# Patient Record
Sex: FEMALE | Race: OTHER | Employment: UNEMPLOYED | URBAN - METROPOLITAN AREA
[De-identification: names, ages, dates, MRNs, and addresses within clinical notes are randomized per-mention and may not be internally consistent; named-entity substitution may affect disease eponyms.]

---

## 2019-05-31 ENCOUNTER — ANESTHESIA EVENT (OUTPATIENT)
Dept: SURGERY | Facility: HOSPITAL | Age: 24
DRG: 419 | End: 2019-05-31
Payer: COMMERCIAL

## 2019-05-31 ENCOUNTER — APPOINTMENT (OUTPATIENT)
Dept: ULTRASOUND IMAGING | Facility: HOSPITAL | Age: 24
DRG: 419 | End: 2019-05-31
Attending: EMERGENCY MEDICINE
Payer: COMMERCIAL

## 2019-05-31 ENCOUNTER — APPOINTMENT (OUTPATIENT)
Dept: GENERAL RADIOLOGY | Facility: HOSPITAL | Age: 24
DRG: 419 | End: 2019-05-31
Attending: SURGERY
Payer: COMMERCIAL

## 2019-05-31 ENCOUNTER — ANESTHESIA (OUTPATIENT)
Dept: SURGERY | Facility: HOSPITAL | Age: 24
DRG: 419 | End: 2019-05-31
Payer: COMMERCIAL

## 2019-05-31 ENCOUNTER — HOSPITAL ENCOUNTER (INPATIENT)
Facility: HOSPITAL | Age: 24
LOS: 3 days | Discharge: HOME | DRG: 419 | End: 2019-06-03
Attending: EMERGENCY MEDICINE | Admitting: INTERNAL MEDICINE
Payer: COMMERCIAL

## 2019-05-31 DIAGNOSIS — K81.9 CHOLECYSTITIS: ICD-10-CM

## 2019-05-31 DIAGNOSIS — K81.0 ACUTE CHOLECYSTITIS: Primary | ICD-10-CM

## 2019-05-31 PROBLEM — R73.9 HYPERGLYCEMIA: Status: ACTIVE | Noted: 2019-05-31

## 2019-05-31 PROCEDURE — 99285 EMERGENCY DEPT VISIT HI MDM: CPT

## 2019-05-31 PROCEDURE — 80048 BASIC METABOLIC PNL TOTAL CA: CPT | Performed by: EMERGENCY MEDICINE

## 2019-05-31 PROCEDURE — 83690 ASSAY OF LIPASE: CPT | Performed by: EMERGENCY MEDICINE

## 2019-05-31 PROCEDURE — 76705 ECHO EXAM OF ABDOMEN: CPT | Performed by: EMERGENCY MEDICINE

## 2019-05-31 PROCEDURE — 80076 HEPATIC FUNCTION PANEL: CPT | Performed by: EMERGENCY MEDICINE

## 2019-05-31 PROCEDURE — 0FC98ZZ EXTIRPATION OF MATTER FROM COMMON BILE DUCT, VIA NATURAL OR ARTIFICIAL OPENING ENDOSCOPIC: ICD-10-PCS | Performed by: SURGERY

## 2019-05-31 PROCEDURE — 0FT44ZZ RESECTION OF GALLBLADDER, PERCUTANEOUS ENDOSCOPIC APPROACH: ICD-10-PCS | Performed by: SURGERY

## 2019-05-31 PROCEDURE — 85025 COMPLETE CBC W/AUTO DIFF WBC: CPT | Performed by: EMERGENCY MEDICINE

## 2019-05-31 PROCEDURE — BF141ZZ FLUOROSCOPY OF GALLBLADDER, BILE DUCTS AND PANCREATIC DUCTS USING LOW OSMOLAR CONTRAST: ICD-10-PCS | Performed by: SURGERY

## 2019-05-31 PROCEDURE — 74300 X-RAY BILE DUCTS/PANCREAS: CPT | Performed by: SURGERY

## 2019-05-31 PROCEDURE — 88304 TISSUE EXAM BY PATHOLOGIST: CPT | Performed by: SURGERY

## 2019-05-31 PROCEDURE — 96361 HYDRATE IV INFUSION ADD-ON: CPT

## 2019-05-31 PROCEDURE — 96375 TX/PRO/DX INJ NEW DRUG ADDON: CPT

## 2019-05-31 PROCEDURE — 81025 URINE PREGNANCY TEST: CPT | Performed by: HOSPITALIST

## 2019-05-31 PROCEDURE — 96365 THER/PROPH/DIAG IV INF INIT: CPT

## 2019-05-31 PROCEDURE — 81001 URINALYSIS AUTO W/SCOPE: CPT | Performed by: EMERGENCY MEDICINE

## 2019-05-31 RX ORDER — DEXAMETHASONE SODIUM PHOSPHATE 4 MG/ML
VIAL (ML) INJECTION AS NEEDED
Status: DISCONTINUED | OUTPATIENT
Start: 2019-05-31 | End: 2019-05-31 | Stop reason: SURG

## 2019-05-31 RX ORDER — NEOSTIGMINE METHYLSULFATE 0.5 MG/ML
INJECTION INTRAVENOUS AS NEEDED
Status: DISCONTINUED | OUTPATIENT
Start: 2019-05-31 | End: 2019-05-31 | Stop reason: SURG

## 2019-05-31 RX ORDER — ONDANSETRON 2 MG/ML
4 INJECTION INTRAMUSCULAR; INTRAVENOUS EVERY 6 HOURS PRN
Status: DISCONTINUED | OUTPATIENT
Start: 2019-05-31 | End: 2019-06-03

## 2019-05-31 RX ORDER — MORPHINE SULFATE 2 MG/ML
2 INJECTION, SOLUTION INTRAMUSCULAR; INTRAVENOUS EVERY 2 HOUR PRN
Status: DISCONTINUED | OUTPATIENT
Start: 2019-05-31 | End: 2019-06-03

## 2019-05-31 RX ORDER — HYDROMORPHONE HYDROCHLORIDE 1 MG/ML
0.5 INJECTION, SOLUTION INTRAMUSCULAR; INTRAVENOUS; SUBCUTANEOUS EVERY 2 HOUR PRN
Status: DISCONTINUED | OUTPATIENT
Start: 2019-05-31 | End: 2019-05-31

## 2019-05-31 RX ORDER — GLYCOPYRROLATE 0.2 MG/ML
INJECTION INTRAMUSCULAR; INTRAVENOUS AS NEEDED
Status: DISCONTINUED | OUTPATIENT
Start: 2019-05-31 | End: 2019-05-31 | Stop reason: SURG

## 2019-05-31 RX ORDER — ONDANSETRON 2 MG/ML
4 INJECTION INTRAMUSCULAR; INTRAVENOUS EVERY 6 HOURS PRN
Status: DISCONTINUED | OUTPATIENT
Start: 2019-05-31 | End: 2019-05-31

## 2019-05-31 RX ORDER — MORPHINE SULFATE 4 MG/ML
4 INJECTION, SOLUTION INTRAMUSCULAR; INTRAVENOUS ONCE
Status: COMPLETED | OUTPATIENT
Start: 2019-05-31 | End: 2019-05-31

## 2019-05-31 RX ORDER — BUPIVACAINE HYDROCHLORIDE 2.5 MG/ML
INJECTION, SOLUTION EPIDURAL; INFILTRATION; INTRACAUDAL AS NEEDED
Status: DISCONTINUED | OUTPATIENT
Start: 2019-05-31 | End: 2019-05-31 | Stop reason: HOSPADM

## 2019-05-31 RX ORDER — LIDOCAINE HYDROCHLORIDE 10 MG/ML
INJECTION, SOLUTION EPIDURAL; INFILTRATION; INTRACAUDAL; PERINEURAL AS NEEDED
Status: DISCONTINUED | OUTPATIENT
Start: 2019-05-31 | End: 2019-05-31 | Stop reason: SURG

## 2019-05-31 RX ORDER — SODIUM CHLORIDE 0.9 % (FLUSH) 0.9 %
3 SYRINGE (ML) INJECTION AS NEEDED
Status: DISCONTINUED | OUTPATIENT
Start: 2019-05-31 | End: 2019-06-03

## 2019-05-31 RX ORDER — ONDANSETRON 2 MG/ML
4 INJECTION INTRAMUSCULAR; INTRAVENOUS ONCE
Status: COMPLETED | OUTPATIENT
Start: 2019-05-31 | End: 2019-05-31

## 2019-05-31 RX ORDER — MIDAZOLAM HYDROCHLORIDE 1 MG/ML
INJECTION INTRAMUSCULAR; INTRAVENOUS AS NEEDED
Status: DISCONTINUED | OUTPATIENT
Start: 2019-05-31 | End: 2019-05-31 | Stop reason: SURG

## 2019-05-31 RX ORDER — TRAMADOL HYDROCHLORIDE 50 MG/1
50 TABLET ORAL EVERY 6 HOURS PRN
Status: DISCONTINUED | OUTPATIENT
Start: 2019-05-31 | End: 2019-06-03

## 2019-05-31 RX ORDER — SODIUM CHLORIDE 9 MG/ML
INJECTION, SOLUTION INTRAVENOUS CONTINUOUS
Status: DISCONTINUED | OUTPATIENT
Start: 2019-05-31 | End: 2019-06-03

## 2019-05-31 RX ORDER — HYDROCODONE BITARTRATE AND ACETAMINOPHEN 5; 325 MG/1; MG/1
1 TABLET ORAL EVERY 6 HOURS PRN
Qty: 20 TABLET | Refills: 0 | Status: SHIPPED | OUTPATIENT
Start: 2019-05-31 | End: 2019-06-03

## 2019-05-31 RX ORDER — DOCUSATE SODIUM 100 MG/1
100 CAPSULE, LIQUID FILLED ORAL 2 TIMES DAILY
Status: DISCONTINUED | OUTPATIENT
Start: 2019-05-31 | End: 2019-06-03

## 2019-05-31 RX ORDER — HYDROCODONE BITARTRATE AND ACETAMINOPHEN 5; 325 MG/1; MG/1
1 TABLET ORAL EVERY 6 HOURS PRN
Status: DISCONTINUED | OUTPATIENT
Start: 2019-05-31 | End: 2019-06-03

## 2019-05-31 RX ORDER — HEPARIN SODIUM 5000 [USP'U]/ML
5000 INJECTION, SOLUTION INTRAVENOUS; SUBCUTANEOUS EVERY 8 HOURS
Status: DISCONTINUED | OUTPATIENT
Start: 2019-05-31 | End: 2019-06-03

## 2019-05-31 RX ORDER — ROCURONIUM BROMIDE 10 MG/ML
INJECTION, SOLUTION INTRAVENOUS AS NEEDED
Status: DISCONTINUED | OUTPATIENT
Start: 2019-05-31 | End: 2019-05-31 | Stop reason: SURG

## 2019-05-31 RX ORDER — ONDANSETRON 2 MG/ML
INJECTION INTRAMUSCULAR; INTRAVENOUS AS NEEDED
Status: DISCONTINUED | OUTPATIENT
Start: 2019-05-31 | End: 2019-05-31 | Stop reason: SURG

## 2019-05-31 RX ORDER — HYDROMORPHONE HYDROCHLORIDE 1 MG/ML
1 INJECTION, SOLUTION INTRAMUSCULAR; INTRAVENOUS; SUBCUTANEOUS EVERY 2 HOUR PRN
Status: DISCONTINUED | OUTPATIENT
Start: 2019-05-31 | End: 2019-05-31

## 2019-05-31 RX ORDER — ACETAMINOPHEN 325 MG/1
650 TABLET ORAL EVERY 6 HOURS PRN
Status: DISCONTINUED | OUTPATIENT
Start: 2019-05-31 | End: 2019-06-03

## 2019-05-31 RX ADMIN — LIDOCAINE HYDROCHLORIDE 30 MG: 10 INJECTION, SOLUTION EPIDURAL; INFILTRATION; INTRACAUDAL; PERINEURAL at 11:53:00

## 2019-05-31 RX ADMIN — ONDANSETRON 4 MG: 2 INJECTION INTRAMUSCULAR; INTRAVENOUS at 12:56:00

## 2019-05-31 RX ADMIN — ROCURONIUM BROMIDE 10 MG: 10 INJECTION, SOLUTION INTRAVENOUS at 11:54:00

## 2019-05-31 RX ADMIN — LIDOCAINE HYDROCHLORIDE 20 MG: 10 INJECTION, SOLUTION EPIDURAL; INFILTRATION; INTRACAUDAL; PERINEURAL at 11:54:00

## 2019-05-31 RX ADMIN — GLYCOPYRROLATE 0.6 MG: 0.2 INJECTION INTRAMUSCULAR; INTRAVENOUS at 13:38:00

## 2019-05-31 RX ADMIN — MIDAZOLAM HYDROCHLORIDE 2 MG: 1 INJECTION INTRAMUSCULAR; INTRAVENOUS at 11:50:00

## 2019-05-31 RX ADMIN — ROCURONIUM BROMIDE 30 MG: 10 INJECTION, SOLUTION INTRAVENOUS at 12:00:00

## 2019-05-31 RX ADMIN — ROCURONIUM BROMIDE 10 MG: 10 INJECTION, SOLUTION INTRAVENOUS at 12:17:00

## 2019-05-31 RX ADMIN — NEOSTIGMINE METHYLSULFATE 3 MG: 0.5 INJECTION INTRAVENOUS at 13:38:00

## 2019-05-31 RX ADMIN — DEXAMETHASONE SODIUM PHOSPHATE 4 MG: 4 MG/ML VIAL (ML) INJECTION at 12:56:00

## 2019-05-31 NOTE — H&P
[facility] Hospitalist H&P       CC: Patient presents with:  Abdominal Pain       PCP: None Pcp    History of Present Illness: Patient is a 30year old female with PMH sig for biliary colic, recent  (4 months ago), who presents with RUQ abd pain, nause Regular rate and rhythm, S1, S2 normal, no murmur, rub or gallop appreciated   Abdomen:   Soft, RUQ pain, midepigastric pain, ND, BS+, no rebound or guarding    Extremities: Extremities normal, atraumatic, no cyanosis or edema.    Skin: Skin color, texture, as well at [address]  - normal T bili  - US without evidence of CBD dilation  - discussed with surgery, did not think MRCP necessary, given LFT's stable since feb, no stone on US and normal Tbili, will check cholangio in surgery and consider liver Bx if neces

## 2019-05-31 NOTE — ANESTHESIA PROCEDURE NOTES
Airway  Date/Time: 5/31/2019 11:55 AM  Urgency: elective      General Information and Staff    Patient location during procedure: OR  Anesthesiologist: Shelia Martin MD  Resident/CRNA: Cindy Montesinos CRNA    Indications and Patient Condition  Indications fo

## 2019-05-31 NOTE — CONSULTS
[facility] HOSP - [facility]    Report of Consultation    Silva Beal Patient Status:  Emergency    1995 MRN [id number]   Location [address] Attending Robert Fair MD   Hosp Day # 0 PCP None Pcp     Date of Admis throat  RESPIRATORY: denies shortness of breath  CARDIOVASCULAR: denies chest pain or DELANEY; no palpitations   GI: denies nausea, vomiting, constipation, diarrhea; no rectal bleeding  GENITAL/: no dysuria,  Nocturia none , frequency none  MUSCULOSKELETAL: 5/31/2019  CONCLUSION:  1. Gallbladder distention with gallbladder sludge and mild gallbladder wall thickening. No cholelithiasis. Positive sonographic Ishmael Opal sign reported. Equivocal findings for acute cholecystitis.   Consider nuclear medicine HIDA sca

## 2019-05-31 NOTE — BRIEF OP NOTE
Pre-Operative Diagnosis: Cholecystitis [K81.9]     Post-Operative Diagnosis: cholecystitis, choledocholithiasis     Procedure Performed:   Procedure(s):  laoparoscopic cholecystectomy with intraoperative cholangiogram, laparoscopic common duct exploration

## 2019-05-31 NOTE — ED INITIAL ASSESSMENT (HPI)
Pt c/o mid to RUQ abdominal pain + nausea+vomiting+constipation started this evening, denies fever, was told by JOLENE DUNN at [facility] a month ago that she has gallstones

## 2019-05-31 NOTE — ED PROVIDER NOTES
Patient Seen in: [facility] AND [facility] Emergency Department    History   Patient presents with:  Abdominal Pain    Stated Complaint: gallstones    HPI    61-year-old female diagnosed with gallstones at an outside hospital.  She had a change of insurance so d exhibits no distension and no mass. There is tenderness in the right upper quadrant and epigastric area. There is no rebound and no guarding. Musculoskeletal: Normal range of motion. She exhibits no edema or tenderness.    Lymphadenopathy:     She has no orders. MDM     Leukocytosis and elevated transaminases noted.  Direct bili just above normal.   Ultrasound read by vision:   Admission disposition: 5/31/2019  6:08 AM         RUQ ABDOMINAL ULTRASOUND    IMPRESSION    Minor sludge no shadowing

## 2019-05-31 NOTE — ANESTHESIA POSTPROCEDURE EVALUATION
Patient: Alvin Amador    Procedure Summary     Date:  05/31/19 Room / Location:  [address] [place] [address] MAIN OR 06 / [address] [place] [address] MAIN OR    Anesthesia Start:  4712 Anesthesia Stop:  5877    Procedure:  LAPAROSCOPIC CHOLECYSTECTOMY (N/A ) Diagnosis:       Cholecystitis      (Cholecy

## 2019-06-01 ENCOUNTER — APPOINTMENT (OUTPATIENT)
Dept: GENERAL RADIOLOGY | Facility: HOSPITAL | Age: 24
DRG: 419 | End: 2019-06-01
Attending: HOSPITALIST
Payer: COMMERCIAL

## 2019-06-01 PROCEDURE — 85025 COMPLETE CBC W/AUTO DIFF WBC: CPT | Performed by: HOSPITALIST

## 2019-06-01 PROCEDURE — 71045 X-RAY EXAM CHEST 1 VIEW: CPT | Performed by: HOSPITALIST

## 2019-06-01 PROCEDURE — 81003 URINALYSIS AUTO W/O SCOPE: CPT | Performed by: HOSPITALIST

## 2019-06-01 PROCEDURE — 80053 COMPREHEN METABOLIC PANEL: CPT | Performed by: HOSPITALIST

## 2019-06-01 PROCEDURE — 83735 ASSAY OF MAGNESIUM: CPT | Performed by: HOSPITALIST

## 2019-06-01 PROCEDURE — 87040 BLOOD CULTURE FOR BACTERIA: CPT | Performed by: HOSPITALIST

## 2019-06-01 RX ORDER — POTASSIUM CHLORIDE 20 MEQ/1
40 TABLET, EXTENDED RELEASE ORAL ONCE
Status: COMPLETED | OUTPATIENT
Start: 2019-06-01 | End: 2019-06-01

## 2019-06-01 NOTE — PLAN OF CARE
Problem: Patient Centered Care  Goal: Patient preferences are identified and integrated in the patient's plan of care  Description  Interventions:  - What would you like us to know as we care for you?   - Provide timely, complete, and accurate informatio period  Description  INTERVENTIONS  - Monitor WBC  - Administer growth factors as ordered  - Implement neutropenic guidelines  Outcome: Progressing     Problem: SAFETY ADULT - FALL  Goal: Free from fall injury  Description  INTERVENTIONS:  - Assess pt freq HOME POSSIBLY 6/1/19.

## 2019-06-01 NOTE — ANESTHESIA PREPROCEDURE EVALUATION
Anesthesia PreOp Note    HPI:     Silva Beal is a 30year old female who presents for preoperative consultation requested by: Tigre Wright MD    Date of Surgery: 5/31/2019    Procedure(s):  LAPAROSCOPIC CHOLECYSTECTOMY  Indication: Cholecystitis [H01. at 05/31/19 2033      Current Outpatient Medications Ordered in Epic:  HYDROcodone-acetaminophen 5-325 MG Oral Tab Take 1 tablet by mouth every 6 (six) hours as needed for Pain.  Disp: 20 tablet Rfl: 0       No Known Allergies    Family History   Problem Re MCV 80.5 05/31/2019    MCH 25.5 (L) 05/31/2019    MCHC 31.7 05/31/2019    RDW 15.1 (H) 05/31/2019    .0 05/31/2019    PREGU Negative 05/31/2019     Lab Results   Component Value Date     05/31/2019    K 3.8 05/31/2019     05/31/2019    C

## 2019-06-01 NOTE — PLAN OF CARE
Problem: Patient Centered Care  Goal: Patient preferences are identified and integrated in the patient's plan of care  Description  Interventions:  - What would you like us to know as we care for you? I just had a baby.    - Provide timely, complete, and Administer growth factors as ordered  - Implement neutropenic guidelines  Outcome: Progressing     Problem: SAFETY ADULT - FALL  Goal: Free from fall injury  Description  INTERVENTIONS:  - Assess pt frequently for physical needs  - Identify cognitive and p Encouraged ambulation to alleviate pain caused by gas.

## 2019-06-01 NOTE — PLAN OF CARE
Pt had lap vangie today with umbilical hernia repair. Abdominal lap sites, one covered with gauze/tegaderm. Morphine given for pain control. Zofran given for nausea. General diet. Bed in lowest position, call light in reach, fall precautions in place. appropriate  Outcome: Progressing     Problem: RISK FOR INFECTION - ADULT  Goal: Absence of fever/infection during anticipated neutropenic period  Description  INTERVENTIONS  - Monitor WBC  - Administer growth factors as ordered  - Implement neutropenic gu

## 2019-06-01 NOTE — PROGRESS NOTES
[facility] Hospitalist Progress Note     CC: Hospital Follow up    PCP: None Pcp       Assessment/Plan:     Principal Problem:    Acute cholecystitis  Active Problems:    Hyperglycemia    Patient is a 30year old female with PMH sig for biliary colic, recent C-se Last 3 Weights  05/31/19 0253 : 150 lb (68 kg)      Exam   Gen: No acute distress, alert and oriented x3   Heent: NC AT, mucous memb clear   Pulm: Lungs clear, normal respiratory effort  CV: Heart with regular rate and rhythm, no peripheral edema  Ab Intravenous Q8H   • docusate sodium  100 mg Oral BID   • Heparin Sodium (Porcine)  5,000 Units Subcutaneous Q8H     • sodium chloride 75 mL/hr at 06/01/19 1005     Normal Saline Flush, acetaminophen, ondansetron HCl, HYDROcodone-acetaminophen, morphINE sul

## 2019-06-02 PROCEDURE — 80053 COMPREHEN METABOLIC PANEL: CPT | Performed by: HOSPITALIST

## 2019-06-02 PROCEDURE — 83735 ASSAY OF MAGNESIUM: CPT | Performed by: HOSPITALIST

## 2019-06-02 PROCEDURE — 85025 COMPLETE CBC W/AUTO DIFF WBC: CPT | Performed by: HOSPITALIST

## 2019-06-02 NOTE — PROGRESS NOTES
Patient alert and oriented X4, vitals stable. Tolerating diet. Ambulated in room and fiore. Continues with IVFs and IV abx. Showered today. Norco and Tramadol for pain control. No distress noted.  Also gabriella palomo left  with lactation consultant, awaiting a

## 2019-06-02 NOTE — PROGRESS NOTES
Spoke with Lactation consultant Mónica Rouse and she stated that Norco and Tramadol were both compatible with breastfeeding.

## 2019-06-02 NOTE — PROGRESS NOTES
[facility] - [facility]    General Surgery Progress Note  Yas Clark  : 1995  CSN: [id number]  HD# 2    Subjective:   POD#2 laparoscopic cholecystectomy and lap CBDE  Complains of fever up to 101.9 last night, postprandial RUQ abdominal pain an 9.2 7.9* 8.1*    140 138   K 3.8 3.7 4.0    106 105   CO2 28.0 26.0 26.0       Lab Results   Component Value Date    WBC 11.0 06/02/2019    HGB 12.4 06/02/2019    HCT 39.2 06/02/2019    .0 06/02/2019     06/02/2019    K 4.0 06/02/2

## 2019-06-02 NOTE — PLAN OF CARE
No acute events overnight. Pain well controlled with Norco/Tramadol. Low grade temperature at start of shift/went down without intervention. Vitals otherwise WNL.        Problem: PAIN - ADULT  Goal: Verbalizes/displays adequate comfort level or patient's s with IV or PO as ordered and tolerated  - Nasogastric tube to low intermittent suction as ordered  - Evaluate effectiveness of ordered antiemetic medications  - Provide nonpharmacologic comfort measures as appropriate  - Advance diet as tolerated, if order

## 2019-06-02 NOTE — PROGRESS NOTES
[facility] Hospitalist Progress Note     CC: Hospital Follow up    PCP: None Pcp       Assessment/Plan:     Principal Problem:    Acute cholecystitis  Active Problems:    Hyperglycemia    Patient is a 30year old female with PMH sig for biliary colic, recent C-se 6/2/2019 1257  Last data filed at 6/2/2019 0824  Gross per 24 hour   Intake 1151.74 ml   Output 750 ml   Net 401.74 ml       Last 3 Weights  05/31/19 0253 : 150 lb (68 kg)      Exam   Gen: No acute distress, alert and oriented x3   Heent: NC AT, mucous mem Choledocholithiasis with retrieval of common duct stone.     Dictated by (CST): Ernie Patel MD on 5/31/2019 at 14:29     Approved by (CST): Ernie Patel MD on 5/31/2019 at 14:38          Xr Chest Ap Portable  (cpt=71045)    Result Luiz

## 2019-06-03 VITALS
RESPIRATION RATE: 18 BRPM | HEART RATE: 91 BPM | BODY MASS INDEX: 26.58 KG/M2 | TEMPERATURE: 99 F | WEIGHT: 150 LBS | DIASTOLIC BLOOD PRESSURE: 51 MMHG | OXYGEN SATURATION: 97 % | SYSTOLIC BLOOD PRESSURE: 107 MMHG | HEIGHT: 63 IN

## 2019-06-03 PROCEDURE — 85025 COMPLETE CBC W/AUTO DIFF WBC: CPT | Performed by: SURGERY

## 2019-06-03 PROCEDURE — 80053 COMPREHEN METABOLIC PANEL: CPT | Performed by: SURGERY

## 2019-06-03 RX ORDER — AMOXICILLIN AND CLAVULANATE POTASSIUM 875; 125 MG/1; MG/1
1 TABLET, FILM COATED ORAL 2 TIMES DAILY
Qty: 10 TABLET | Refills: 0 | Status: SHIPPED | OUTPATIENT
Start: 2019-06-03 | End: 2019-06-08

## 2019-06-03 RX ORDER — FAMOTIDINE 10 MG
10 TABLET ORAL 2 TIMES DAILY
Status: DISCONTINUED | OUTPATIENT
Start: 2019-06-03 | End: 2019-06-03

## 2019-06-03 RX ORDER — TRAMADOL HYDROCHLORIDE 50 MG/1
50 TABLET ORAL EVERY 6 HOURS PRN
Qty: 15 TABLET | Refills: 0 | Status: SHIPPED | OUTPATIENT
Start: 2019-06-03

## 2019-06-03 RX ORDER — POTASSIUM CHLORIDE 20 MEQ/1
40 TABLET, EXTENDED RELEASE ORAL ONCE
Status: COMPLETED | OUTPATIENT
Start: 2019-06-03 | End: 2019-06-03

## 2019-06-03 RX ORDER — ACETAMINOPHEN 325 MG/1
650 TABLET ORAL EVERY 6 HOURS PRN
Qty: 60 TABLET | Refills: 0 | Status: SHIPPED | OUTPATIENT
Start: 2019-06-03

## 2019-06-03 NOTE — PLAN OF CARE
Problem: Patient Centered Care  Goal: Patient preferences are identified and integrated in the patient's plan of care  Description  Interventions:  - What would you like us to know as we care for you?   - Provide timely, complete, and accurate informatio guidelines  Outcome: Progressing     Problem: SAFETY ADULT - FALL  Goal: Free from fall injury  Description  INTERVENTIONS:  - Assess pt frequently for physical needs  - Identify cognitive and physical deficits and behaviors that affect risk of falls.   - I

## 2019-06-03 NOTE — PAYOR COMM NOTE
--------------  ADMISSION REVIEW     Payor: Yuko Hansen BY KRISSY  Subscriber #:  [id number]  Authorization Number: Pend    Admit date: 5/31/19  Admit time: 46       Admitting Physician: Ally Ugarte DO  Attending Physician:  Abigail Keys MD  Pr 26.57 kg/m²          Physical Exam   Constitutional: She is oriented to person, place, and time. She appears well-developed and well-nourished. She appears distressed (appears uncomfortable). HENT:   Head: Normocephalic and atraumatic.    Mouth/Throat: Or Absolute Prelim 11.06 (*)     Neutrophil Absolute 11.06 (*)     Lymphocyte Absolute 0.91 (*)     All other components within normal limits   LIPASE - Normal   CBC WITH DIFFERENTIAL WITH PLATELET    Narrative:      The following orders were created for panel cholecystitis K81.0 5/31/2019 Unknown    Hyperglycemia R73.9 5/31/2019 Yes           Signed by Cass Simon MD on 5/31/2019  6:49 AM            H&P - H&P Note      H&P signed by Herrera Green MD at 5/31/2019  8:41 AM     Author:  Herrera Green MD Service: 26.57 kg/m²    General:  Alert, no distress   Head:  Normocephalic, without obvious abnormality, atraumatic. Eyes:  Sclera anicteric, No conjunctival pallor    Nose: Nares normal. Septum midline. Mucosa normal. No drainage.    Throat: Lips, mucosa, and to thickening, sludge, equivocal  - has WBC 12.5, clinically with acute vangie on exam  - persistent intermittent pain for last several weeks  - zosyn given   - seen by gen surg plan for Lap vangie today  - NPO, IVF  - check UA preg  - no chronic medical condit [facility]) 5-325 MG per tab 1 tablet     Date Action Dose Route User    6/2/2019 2104 Given 1 tablet Oral Jaylene Carmichael RN    6/2/2019 1203 Given 1 tablet Oral Michell William, RN      Piperacillin Sod-Tazobactam So (ZOSYN) 3.375 g in dextrose 5 % 100 Active Problem List:     Hyperglycemia     Acute cholecystitis        We discussed options of treatment including medical management only vs surgical treatment.  The risks, benefits, alternatives and complications of laparoscopic cholecystectomy including b diet     DVT Prophy:scd, heparin  Atrophy: IS  Lines: PIV     Dispo: pending clinical course, continue to monitor in hospital at least 1 more day     Questions/concerns were discussed with patient and/or family by bedside.     Thank You,  Juani Raymundo MD    SURGERY NOTE  HD# 2     Subjective:   POD#2 laparoscopic cholecystectomy and lap CBDE  Complains of fever up to 101.9 last night, postprandial RUQ abdominal pain and denies N/V  Passing flatus and BM(s)      Exam:      General: awake and alert, in no acute 4.0    106 105   CO2 28.0 26.0 26.0      6/2 HOSPITALIST NOTE   Assessment/Plan:      Principal Problem:    Acute cholecystitis  Active Problems:    Hyperglycemia     Patient is a 30year old female with PMH sig for biliary colic, recent  (4 at 6/2/2019 1257  Last data filed at 6/2/2019 0824      Gross per 24 hour   Intake 1151.74 ml   Output 750 ml   Net 401.74 ml         Last 3 Weights  05/31/19 0253 : 150 lb (68 kg)        Exam   Gen: No acute distress, alert and oriented x3   Heent: NC AT,

## 2019-06-03 NOTE — PROGRESS NOTES
[facility] - [facility]    General Surgery Progress Note  Mally Ray  : 1995  CSN: [id number]  HD# 4    Subjective:   POD#3 laparoscopic cholecystectomy and lap CBDE  Pt  Had fevers night before last, better today  abd soft  Planning send home 137   K 3.7 4.0 3.8    105 101   CO2 26.0 26.0 28.0       Lab Results   Component Value Date    WBC 9.9 06/03/2019    HGB 13.3 06/03/2019    HCT 42.6 06/03/2019    .0 06/03/2019     06/03/2019    K 3.8 06/03/2019     06/03/2019

## 2019-06-03 NOTE — LACTATION NOTE
LACTATION NOTE - MOTHER      Evaluation Type: Inpatient    Problems identified  Problems identified: Knowledge deficit    Maternal history  Maternal history:  section  Other/comment: cholecystitis    Breastfeeding goal  Breastfeeding goal: To maint addition of date and time patient expressed her breastmilk. Instructed her to store her expressed breastmilk on ice (enclosed in zip lock bags) at bedside in a plastic bin.  Explained that as long as the ice is replaced, the EBM can sit on ice up to 24 robert breastfeeding of infant once she is discharged.  Discussed pumping plan to include pumping at least 8-12 times in 24 hours while in the hospital.  Discussed post discharged breastfeeding resources, including use of the \"warmline\" ([phone]) and the We

## 2019-06-03 NOTE — OPERATIVE REPORT
[facility]    PATIENT'S NAME: MAREK Arias   ATTENDING PHYSICIAN: Ramírez Barney MD   OPERATING PHYSICIAN: Tomer Jones.  Usha Bhatt MD   PATIENT ACCOUNT#:   [id number]    LOCATION:  [address] [place] [address] RECORD #:   [id number]       DATE OF BIRTH:  [birth year] table and had a small umbilical hernia which was quite small. There was fat stuck in this, preperitoneal fat. We removed this and it fit the 10 mm scope well. We placed this in the abdomen and inspected.   There was a little edema of the gallbladder but vision. The fascia umbilical site was closed transversely with interrupted 0 Prolene, then that sutured down over with 0 Vicryl. The skin was sutured back down through the skin. The umbilicus was sutured back down to the repair with 0 Vicryl.   The skin

## 2019-06-03 NOTE — DISCHARGE SUMMARY
General Medicine Discharge Summary     Patient ID:  Sharan Taylor  30year old  4/6/1995    Admit date: 5/31/2019    Discharge date and time: 6/3/19    Attending Physician: Lilibeth Streeter MD     Consults: IP CONSULT TO HOSPITALIST  IP CONSULT TO GENERAL SURGER course with fever and nausea, clinically improved, nausea resolved, fever resolved work up negative, LFT's improved, tolerated diet, patient cleared for DC home per General surgery.   Fu with general surgery in 1 week.          Acute cholecystitis / N&V  - (six) hours as needed.            Where to Get Your Medications      You can get these medications from any pharmacy    Bring a paper prescription for each of these medications  · acetaminophen 325 MG Tabs  · Amoxicillin-Pot Clavulanate 875-125 MG Tabs  · t

## 2019-06-04 NOTE — PAYOR COMM NOTE
REQUESTING 3 INPT DAYS.      DISCHARGE REVIEW    Payor: [employer]  Subscriber #:  [id number]  Authorization Number: Shalom Matias date: 5/31/19  Admit time:  0701  Discharge Date: 6/3/2019  5:35 PM     Admitting Physician: Aruna Phipps with RUQ abd pain, nausea and vomiting.   Patient states she has had intermittent RUQ abd pain for the past month (was seen at [address] [place] [address] 4 month ago diagnosed with cholelithiasis) last night she dev persistent RUQ pain/midepigastric pain, radiating to her back cardiopulmonary abnormality.    Dictated by (CST): Brian Mejia MD on 6/01/2019 at 19:01     Approved by (CST): Brian Mejia MD on 6/01/2019 at 19:01              Disposition: home    Activity: activity as tolerated  Diet: regular diet  Wound Care: as direc plan as outlined    Thank Nelli Barrett M.D.  [facility] Hospitalist  Pager

## 2019-07-13 ENCOUNTER — HOSPITAL ENCOUNTER (EMERGENCY)
Facility: HOSPITAL | Age: 24
Discharge: HOME | End: 2019-07-13
Attending: EMERGENCY MEDICINE
Payer: COMMERCIAL

## 2019-07-13 VITALS
SYSTOLIC BLOOD PRESSURE: 125 MMHG | WEIGHT: 141 LBS | BODY MASS INDEX: 25 KG/M2 | DIASTOLIC BLOOD PRESSURE: 72 MMHG | TEMPERATURE: 98 F | OXYGEN SATURATION: 100 % | HEART RATE: 64 BPM | RESPIRATION RATE: 20 BRPM

## 2019-07-13 DIAGNOSIS — K64.4 EXTERNAL HEMORRHOID: Primary | ICD-10-CM

## 2019-07-13 PROCEDURE — 99282 EMERGENCY DEPT VISIT SF MDM: CPT

## 2019-07-13 RX ORDER — POLYETHYLENE GLYCOL 3350 17 G/17G
17 POWDER, FOR SOLUTION ORAL DAILY PRN
Qty: 12 EACH | Refills: 0 | Status: SHIPPED | OUTPATIENT
Start: 2019-07-13 | End: 2019-08-12

## 2019-07-13 RX ORDER — DIBUCAINE 0.28 G/28G
1 OINTMENT TOPICAL 3 TIMES DAILY
Qty: 28 G | Refills: 0 | Status: SHIPPED | OUTPATIENT
Start: 2019-07-13

## 2019-07-14 NOTE — ED INITIAL ASSESSMENT (HPI)
Pt states she discovered what she think is a hemorrhoid today when having a bowel movement, denies any bleeding

## 2019-07-14 NOTE — ED PROVIDER NOTES
Patient Seen in: [facility] AND [facility] Emergency Department    History   No chief complaint on file.     Stated Complaint: Hemorrhoid    HPI    22-year-old female patient presents complaining of noticing a bump although slightly uncomfortable while taking a s display           MDM   Treat symptomatically with dibucaine ointment. Will provide laxative. Recommended follow-up with her surgeon.               Disposition and Plan     Clinical Impression:  External hemorrhoid  (primary encounter diagnosis)    Dispos

## (undated) DEVICE — SOL  .9 1000ML BTL

## (undated) DEVICE — DISPOSABLE LAPAROSCOPIC CLIP APPLIER WITH 20 CLIPS.: Brand: EPIX® UNIVERSAL CLIP APPLIER

## (undated) DEVICE — TROCAR: Brand: KII FIOS FIRST ENTRY

## (undated) DEVICE — CAUTERY BLADE 2IN INS E1455

## (undated) DEVICE — ENCORE® LATEX ACCLAIM SIZE 8, STERILE LATEX POWDER-FREE SURGICAL GLOVE: Brand: ENCORE

## (undated) DEVICE — TISSUE RETRIEVAL SYSTEM: Brand: INZII RETRIEVAL SYSTEM

## (undated) DEVICE — SUTURE PDS II 0 CT-1

## (undated) DEVICE — SUTURE PROLENE 0 CT-1

## (undated) DEVICE — ENCORE® LATEX MICRO SIZE 8, STERILE LATEX POWDER-FREE SURGICAL GLOVE: Brand: ENCORE

## (undated) DEVICE — SUTURE VICRYL 0 UR-6

## (undated) DEVICE — OPEN-END URETERAL CATHETER SOF-FLEX: Brand: SOF-FLEX

## (undated) DEVICE — SOL  .9 3000ML

## (undated) DEVICE — 12 ML SYRINGE LUER-LOCK TIP: Brand: MONOJECT

## (undated) DEVICE — UNDYED BRAIDED (POLYGLACTIN 910), SYNTHETIC ABSORBABLE SUTURE: Brand: COATED VICRYL

## (undated) DEVICE — DERMABOND LIQUID ADHESIVE

## (undated) DEVICE — NEEDLE HPO 18GA 1.5IN ECLPS

## (undated) DEVICE — LAP CHOLE: Brand: MEDLINE INDUSTRIES, INC.

## (undated) DEVICE — PTFE WIRE GUIDE WITH 3CM FLEXIBLE TIP: Brand: COOK

## (undated) DEVICE — TROCAR: Brand: KII® SLEEVE

## (undated) DEVICE — [HIGH FLOW INSUFFLATOR,  DO NOT USE IF PACKAGE IS DAMAGED,  KEEP DRY,  KEEP AWAY FROM SUNLIGHT,  PROTECT FROM HEAT AND RADIOACTIVE SOURCES.]: Brand: PNEUMOSURE

## (undated) NOTE — ED AVS SNAPSHOT
Radha Gonzalez   MRN: [id number]    Department:  [facility] Emergency Department   Date of Visit:  7/13/2019           Disclosure     Insurance plans vary and the physician(s) referred by the ER may not be covered by your plan.  Please contact yo CARE PHYSICIAN AT ONCE OR RETURN IMMEDIATELY TO THE EMERGENCY DEPARTMENT. If you have been prescribed any medication(s), please fill your prescription right away and begin taking the medication(s) as directed.   If you believe that any of the medications